# Patient Record
Sex: MALE | Race: WHITE | NOT HISPANIC OR LATINO | URBAN - NONMETROPOLITAN AREA
[De-identification: names, ages, dates, MRNs, and addresses within clinical notes are randomized per-mention and may not be internally consistent; named-entity substitution may affect disease eponyms.]

---

## 2021-02-19 ENCOUNTER — PREPPED CHART (OUTPATIENT)
Dept: URBAN - NONMETROPOLITAN AREA CLINIC 1 | Facility: CLINIC | Age: 61
End: 2021-02-19

## 2021-02-19 ENCOUNTER — IMPORTED ENCOUNTER (OUTPATIENT)
Dept: URBAN - NONMETROPOLITAN AREA CLINIC 1 | Facility: CLINIC | Age: 61
End: 2021-02-19

## 2021-02-19 PROBLEM — H52.4: Noted: 2021-02-19

## 2021-02-19 PROBLEM — H26.493: Noted: 2021-02-19

## 2021-02-19 PROBLEM — Z96.1: Noted: 2021-02-19

## 2021-02-19 PROBLEM — H16.223: Noted: 2021-02-19

## 2021-02-19 PROCEDURE — S0621 ROUTINE OPHTHALMOLOGICAL EXA: HCPCS

## 2021-02-19 NOTE — PATIENT DISCUSSION
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

## 2022-03-19 ASSESSMENT — VISUAL ACUITY
OD_SC: 20/70-1
OS_SC: 20/25-1
OD_BAT: 20/25-1
OS_SC: 20/50
OD_SC: 20/30-2
OS_BAT: 20/25-1

## 2022-03-19 ASSESSMENT — TONOMETRY
OD_IOP_MMHG: 16
OS_IOP_MMHG: 16

## 2022-04-15 ASSESSMENT — VISUAL ACUITY
OD_CC: 20/30-2
OS_GLARE: 20/25-
OS_SC: 20/50
OS_CC: 20/25-1
OD_SC: 20/70-

## 2022-04-15 ASSESSMENT — TONOMETRY
OS_IOP_MMHG: 16
OD_IOP_MMHG: 16

## 2022-08-12 ENCOUNTER — COMPREHENSIVE EXAM (OUTPATIENT)
Dept: URBAN - NONMETROPOLITAN AREA CLINIC 1 | Facility: CLINIC | Age: 62
End: 2022-08-12

## 2022-08-12 DIAGNOSIS — H16.223: ICD-10-CM

## 2022-08-12 DIAGNOSIS — H52.4: ICD-10-CM

## 2022-08-12 DIAGNOSIS — H26.493: ICD-10-CM

## 2022-08-12 PROCEDURE — 92015 DETERMINE REFRACTIVE STATE: CPT

## 2022-08-12 PROCEDURE — 99213 OFFICE O/P EST LOW 20 MIN: CPT

## 2022-08-12 ASSESSMENT — TONOMETRY
OD_IOP_MMHG: 16
OS_IOP_MMHG: 15

## 2022-08-12 ASSESSMENT — VISUAL ACUITY
OD_SC: 20/22-
OS_BAT: 20/25
OD_BAT: 20/25
OS_SC: 20/20-